# Patient Record
Sex: FEMALE | Race: WHITE
[De-identification: names, ages, dates, MRNs, and addresses within clinical notes are randomized per-mention and may not be internally consistent; named-entity substitution may affect disease eponyms.]

---

## 2018-07-10 ENCOUNTER — HOSPITAL ENCOUNTER (EMERGENCY)
Dept: HOSPITAL 35 - ER | Age: 38
Discharge: HOME | End: 2018-07-10
Payer: COMMERCIAL

## 2018-07-10 VITALS — DIASTOLIC BLOOD PRESSURE: 65 MMHG | SYSTOLIC BLOOD PRESSURE: 110 MMHG

## 2018-07-10 VITALS — HEIGHT: 65 IN | WEIGHT: 180.01 LBS | BODY MASS INDEX: 29.99 KG/M2

## 2018-07-10 DIAGNOSIS — Z88.8: ICD-10-CM

## 2018-07-10 DIAGNOSIS — Z90.49: ICD-10-CM

## 2018-07-10 DIAGNOSIS — R07.89: ICD-10-CM

## 2018-07-10 DIAGNOSIS — Z91.012: ICD-10-CM

## 2018-07-10 DIAGNOSIS — R09.1: Primary | ICD-10-CM

## 2018-07-10 DIAGNOSIS — Z88.1: ICD-10-CM

## 2018-07-10 DIAGNOSIS — R06.02: ICD-10-CM

## 2018-07-10 LAB
ANION GAP SERPL CALC-SCNC: 7 MMOL/L (ref 7–16)
BASOPHILS NFR BLD AUTO: 0.9 % (ref 0–2)
BUN SERPL-MCNC: 18 MG/DL (ref 7–18)
CALCIUM SERPL-MCNC: 9.3 MG/DL (ref 8.5–10.1)
CHLORIDE SERPL-SCNC: 105 MMOL/L (ref 98–107)
CO2 SERPL-SCNC: 27 MMOL/L (ref 21–32)
CREAT SERPL-MCNC: 1.1 MG/DL (ref 0.6–1)
EOSINOPHIL NFR BLD: 7.2 % (ref 0–3)
ERYTHROCYTE [DISTWIDTH] IN BLOOD BY AUTOMATED COUNT: 14.8 % (ref 10.5–14.5)
GLUCOSE SERPL-MCNC: 96 MG/DL (ref 74–106)
GRANULOCYTES NFR BLD MANUAL: 62.2 % (ref 36–66)
HCT VFR BLD CALC: 39.9 % (ref 37–47)
HGB BLD-MCNC: 13.9 GM/DL (ref 12–15)
LYMPHOCYTES NFR BLD AUTO: 23.4 % (ref 24–44)
MCH RBC QN AUTO: 29.8 PG (ref 26–34)
MCHC RBC AUTO-ENTMCNC: 34.7 G/DL (ref 28–37)
MCV RBC: 86 FL (ref 80–100)
MONOCYTES NFR BLD: 6.3 % (ref 1–8)
NEUTROPHILS # BLD: 5.4 THOU/UL (ref 1.4–8.2)
PLATELET # BLD: 377 THOU/UL (ref 150–400)
POTASSIUM SERPL-SCNC: 4.4 MMOL/L (ref 3.5–5.1)
RBC # BLD AUTO: 4.64 MIL/UL (ref 4.2–5)
SODIUM SERPL-SCNC: 139 MMOL/L (ref 136–145)
TROPONIN I SERPL-MCNC: <0.06 NG/ML (ref ?–0.06)
WBC # BLD AUTO: 8.7 THOU/UL (ref 4–11)

## 2018-07-12 ENCOUNTER — HOSPITAL ENCOUNTER (EMERGENCY)
Dept: HOSPITAL 35 - ER | Age: 38
Discharge: HOME | End: 2018-07-12
Payer: COMMERCIAL

## 2018-07-12 VITALS — DIASTOLIC BLOOD PRESSURE: 72 MMHG | SYSTOLIC BLOOD PRESSURE: 116 MMHG

## 2018-07-12 VITALS — BODY MASS INDEX: 29.99 KG/M2 | HEIGHT: 65 IN | WEIGHT: 180.01 LBS

## 2018-07-12 DIAGNOSIS — Z88.8: ICD-10-CM

## 2018-07-12 DIAGNOSIS — Z91.012: ICD-10-CM

## 2018-07-12 DIAGNOSIS — Z86.718: ICD-10-CM

## 2018-07-12 DIAGNOSIS — M27.3: Primary | ICD-10-CM

## 2018-07-12 DIAGNOSIS — Z88.0: ICD-10-CM

## 2018-07-12 DIAGNOSIS — Z88.1: ICD-10-CM

## 2018-07-12 DIAGNOSIS — Z90.49: ICD-10-CM

## 2018-07-23 ENCOUNTER — HOSPITAL ENCOUNTER (EMERGENCY)
Dept: HOSPITAL 35 - ER | Age: 38
Discharge: HOME | End: 2018-07-23
Payer: COMMERCIAL

## 2018-07-23 VITALS — HEIGHT: 65 IN | WEIGHT: 180.01 LBS | BODY MASS INDEX: 29.99 KG/M2

## 2018-07-23 VITALS — SYSTOLIC BLOOD PRESSURE: 118 MMHG | DIASTOLIC BLOOD PRESSURE: 79 MMHG

## 2018-07-23 DIAGNOSIS — Z88.0: ICD-10-CM

## 2018-07-23 DIAGNOSIS — Z88.1: ICD-10-CM

## 2018-07-23 DIAGNOSIS — R07.1: Primary | ICD-10-CM

## 2018-07-23 DIAGNOSIS — Z90.49: ICD-10-CM

## 2018-07-23 DIAGNOSIS — N80.9: ICD-10-CM

## 2018-07-23 DIAGNOSIS — Z86.718: ICD-10-CM

## 2018-07-23 DIAGNOSIS — Z90.89: ICD-10-CM

## 2018-07-23 DIAGNOSIS — Z91.012: ICD-10-CM

## 2018-07-23 DIAGNOSIS — Z88.8: ICD-10-CM

## 2018-07-23 LAB
ALBUMIN SERPL-MCNC: 3.6 G/DL (ref 3.4–5)
ALT SERPL-CCNC: 31 U/L (ref 30–65)
ANION GAP SERPL CALC-SCNC: 8 MMOL/L (ref 7–16)
AST SERPL-CCNC: 15 U/L (ref 15–37)
BASOPHILS NFR BLD AUTO: 0 % (ref 0–2)
BILIRUB SERPL-MCNC: 0.2 MG/DL
BUN SERPL-MCNC: 29 MG/DL (ref 7–18)
CALCIUM SERPL-MCNC: 8.9 MG/DL (ref 8.5–10.1)
CHLORIDE SERPL-SCNC: 106 MMOL/L (ref 98–107)
CO2 SERPL-SCNC: 27 MMOL/L (ref 21–32)
CREAT SERPL-MCNC: 0.9 MG/DL (ref 0.6–1)
D DIMER PPP FEU-MCNC: 1.19 UG/MLFEU (ref 0.19–0.5)
EOSINOPHIL NFR BLD: 7 % (ref 0–3)
ERYTHROCYTE [DISTWIDTH] IN BLOOD BY AUTOMATED COUNT: 15.3 % (ref 10.5–14.5)
GLUCOSE SERPL-MCNC: 99 MG/DL (ref 74–106)
GRANULOCYTES NFR BLD MANUAL: 55 % (ref 36–66)
HCT VFR BLD CALC: 35.9 % (ref 37–47)
HGB BLD-MCNC: 12.7 GM/DL (ref 12–15)
INR PPP: 1
LYMPHOCYTES NFR BLD AUTO: 30 % (ref 24–44)
MCH RBC QN AUTO: 30.4 PG (ref 26–34)
MCHC RBC AUTO-ENTMCNC: 35.3 G/DL (ref 28–37)
MCV RBC: 85.9 FL (ref 80–100)
MONOCYTES NFR BLD: 7 % (ref 1–8)
NEUTROPHILS # BLD: 5.3 THOU/UL (ref 1.4–8.2)
NEUTS BAND NFR BLD: 1 % (ref 0–8)
PLATELET # BLD EST: NORMAL 10*3/UL
PLATELET # BLD: 404 THOU/UL (ref 150–400)
POTASSIUM SERPL-SCNC: 4 MMOL/L (ref 3.5–5.1)
PROT SERPL-MCNC: 6.5 G/DL (ref 6.4–8.2)
PROTHROMBIN TIME: 10.1 SECONDS (ref 9.3–11.4)
RBC # BLD AUTO: 4.18 MIL/UL (ref 4.2–5)
RBC MORPH BLD: NORMAL
SODIUM SERPL-SCNC: 141 MMOL/L (ref 136–145)
TROPONIN I SERPL-MCNC: <0.06 NG/ML (ref ?–0.06)
WBC # BLD AUTO: 9.5 THOU/UL (ref 4–11)

## 2019-01-16 ENCOUNTER — HOSPITAL ENCOUNTER (OUTPATIENT)
Dept: HOSPITAL 35 - PAIN | Age: 39
End: 2019-01-16
Attending: ANESTHESIOLOGY
Payer: COMMERCIAL

## 2019-01-16 VITALS — SYSTOLIC BLOOD PRESSURE: 124 MMHG | DIASTOLIC BLOOD PRESSURE: 83 MMHG

## 2019-01-16 VITALS — BODY MASS INDEX: 28.32 KG/M2 | HEIGHT: 65 IN | WEIGHT: 170 LBS

## 2019-01-16 DIAGNOSIS — S92.214A: ICD-10-CM
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
## 2019-01-16 DIAGNOSIS — G89.4: ICD-10-CM

## 2019-01-16 DIAGNOSIS — M79.671: ICD-10-CM

## 2019-01-16 DIAGNOSIS — M17.11: Primary | ICD-10-CM
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
## 2019-01-16 NOTE — NUR
Pain Clinic Assessment:
 
1. History of Osteoarthritis:
osteopenia
   History of Rheumatoid Arthritis:
Not Applicable
 
2. Height: 5 ft. 5 in. 165.1 cm.
   Weight: 170.0 lb.  oz. 77.112 kg.
   Patient's BMI: 28.3
 
3. Vital Signs:
   BP: 124/83 Pulse: 93 Resp: 16
   Temp:  02 Sat: 98 ECG Mon:
 
4. Pain Intensity: 7
 
5. Fall Risk:
   Dizziness: N  Needs help standing or walking: N
   Fallen in the last 3 months: Y
   Fall risk comments:
 
 
6. Patient on Blood Thinner: Clopidogrel Bisulf(Plavix
 
7. History of Hypertension: N
 
8. Opioid Therapy greater than 6 weeks:
   Opiate Contract Signed:
 
9. Risk Assessment Tool Provided: low
 
10. Functional Assessment Tool: 36/70
 
11. Recreational Drug Use: Never Drug Type:
    Tobacco Use: Never Smoker Tobacco Type:
       Amount or Packs/day:  How Many Years:
    Alcohol Use: Yes  Frequency: Special Occasions Quant:

## 2019-01-16 NOTE — HPC
[facility]
[place]   [address]                     PAIN MANAGEMENT CONSULTATION  
_______________________________________________________________________________
 
Name:       RALPH HONG NICANOR           [facility] #:                     [facility]      [id number]                       Account #:      [id number]  
Admission:  01/16/19    Attend Phys:    Cesar Malhotra DO  
Discharge:              Date of Birth:  [birth year]  
                                                          Report #: [id number]
                                                                    [id number]   
_______________________________________________________________________________
THIS REPORT FOR:   //name//                          
 
CC: Rhoda Augustine
 
DATE OF SERVICE:  01/16/2019
 
 
CHIEF COMPLAINT:  Right knee pain, right foot pain.
 
HISTORY OF PRESENT ILLNESS:  As you know, the patient is a 38-year-old female
who reports increasing right knee pain and right foot pain began in 12/2018. 
The patient apparently has had longstanding right knee pain, which will require
surgical intervention according to the patient in April once she is able to come
off her anticoagulant in the form of Plavix.  She apparently sustained a fall on
12/03/2018 with a potential inversion injury.  It was noted that the patient has
a cuboid nondisplaced fracture on that right foot.  She has been treated through
her orthopedic surgeon in regards to this issue.  The patient continues to
experience pain and will not be able to undergo surgery until April due to use
of Plavix therapy.  She was referred to our clinic to discuss appropriateness of
medication management in her case.  She does have a nondisplaced cuboid fracture
with associated reactive edema and the patient is in a Cam boot currently.  She
has been advised to use limited weightbearing.  She also continues to experience
right knee pain, which is exacerbated by the Cam boot.  She has been referred to
our clinic to discuss appropriateness of medication management.
 
The patient indicates today pain is continuous, describes the pain as aching,
sharp and throbbing, places current pain score 7/10, daily average at 6-7/10,
worst pain has been at 9/10.  The patient states pain is exacerbated with
activity, improves with rest and medications.  The majority of her pain is
located in the right knee and right foot.
 
PAST MEDICAL HISTORY:
1.  Depression.
2.  Chronic anticoagulation.
3.  Gastroesophageal reflux disease.
4.  Osteopenia.
5.  Crohn disease.
 
PAST SURGICAL HISTORY:
1.  Closure of a PFO.
2.  Right meniscal injury repair.
3.  Right hip repair.
 
SOCIAL HISTORY:  The patient denies tobacco, IV or illicit drug use.  Admits to
 
 
 
[address]   [address]                     PAIN MANAGEMENT CONSULTATION  
_______________________________________________________________________________
 
Name:       RALPH HONG NICANOR           Room #:                     REG [facility] 
SUSANNA#:      [id number]                       Account #:      [id number]  
Admission:  01/16/19    Attend Phys:    Cesar Malhotra DO  
Discharge:              Date of Birth:  [birth year]  
                                                          Report #: [id number]
                                                                    [id number]   
_______________________________________________________________________________
3 alcoholic beverages per week.  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  She has been out
of work for 2 months.  She is not receiving workmen's compensation or is she
trying to obtain disability benefits.  She is not in litigation in regards to
pain.
 
REVIEW OF SYSTEMS:  Positive for nausea, painful menses, irregular menses,
depression, bleeding and bruising tendencies, chronic abdominal pain,
osteopenia, right knee pain, right foot pain status post injury.  All other
review of systems negative per 12-point review of systems other than those
listed in the history of present illness.
 
Pain impact score 36/70 indicating moderate interference of daily activities
secondary to pain.
 
ALLERGIES:  COMPAZINE, PENICILLIN, AMITRIPTYLINE, LEVOTHYROXINE.
 
CURRENT MEDICATIONS:  Tylenol Extra Strength 1000 mg every 6 hours, aspirin 81
mg per day, Lexapro 10 mg per day, Imuran 50 mg once a day, venlafaxine 
mg once a day, pantoprazole 40 mg per day, Plavix 75 mg per day, clonazepam 1 mg
p.r.n. anxiety, Abilify 5 mg per day.
 
IMAGING:  There is no imaging of the right knee.
 
MRI of right foot shows nondisplaced cuboid fracture with associated reactive
edema.  Normal edema seen within the metatarsals, minimal soft tissue edema
noted laterally.
 
PHYSICAL EXAMINATION:
VITAL SIGNS:  Blood pressure 124/83, pulse 93, respiratory rate 16 and
unlabored.  The patient is 98% on room air.  Height 5 feet 5 inches tall, weight
170 pounds, BMI calculated 28.3.
GENERAL:  Well-developed, well-nourished, well-hydrated 38-year-old female,
appearing her stated age.  She is in no acute distress, awake, alert and
oriented x 3.  Pain is rated today at 7/10 involving right knee and right foot.
HEENT:  Normocephalic, atraumatic.  Pupils equal, round, reactive to light. 
Extraocular muscles are intact.  Sclerae nonicteric without injection.
NEUROLOGIC:  Cranial nerves 2 through 12 grossly intact.  Speech is fluent.  The
patient deemed a fair historian.
LUNGS:  Clear, no wheeze, rhonchi or rales.
CARDIOVASCULAR:  Regular.  No appreciable gallop, no rub.
ABDOMEN:  Soft, mildly obese, normoactive bowel sounds.
EXTREMITIES:  Show no clubbing, no cyanosis, no edema.
MUSCULOSKELETAL:  The patient has a Cam boot over right foot.  Gait is antalgic
secondary to a Cam boot and right knee pain.  Active and passive range of motion
of right knee intensifies knee pain with no radiation of symptoms.  Muscle bulk
and tone appears grossly symmetrical when comparing left lower extremity to
 
 
 
[address]   [address]                     PAIN MANAGEMENT CONSULTATION  
_______________________________________________________________________________
 
Name:       RALPH HONG           Room #:                     REG [facility]#:      [id number]                       Account #:      [id number]  
Admission:  01/16/19    Attend Phys:    Cesar Malhotra DO  
Discharge:              Date of Birth:  [birth year]  
                                                          Report #: [id number]
                                                                    [id number]   
_______________________________________________________________________________
right.
 
ASSESSMENT:
1.  Right knee pain.
2.  Right knee osteoarthritis.
3.  Right foot pain.
4.  Right cuboid nondisplaced fracture.
5.  Chronic intractable pain.
 
PLAN:  The patient has been referred to our service to discuss the
appropriateness of medication management.  We have taken the liberty prior to
the patient coming to our clinic to perform K-TRACS and MO-TRACS evaluation. 
Our research has shown the patient has received multiple prescriptions of opioid
medications over the past year, seen 29 different prescribers and filling opioid
prescriptions at 20 different pharmacies.  She has received over 55
prescriptions of opioids in the timeframe that we have reviewed.  Due to these
findings, we do not feel that it would be appropriate for us to initiate
medication therapy.  We will not be following the patient long-term and the
findings of this K-TRACS and MO-TRACS evaluation are quite concerning for misuse
of medication management.  I have discussed this with the patient today.  She
indicates the majority of these prescriptions were provided through different
Emergency Departments that she has gone to for various pain issues.  This would
certainly address the multiple physicians she saw, but this does not answer the
question why the patient was filling these prescriptions at 20 different
pharmacies and at many times utilizing cash payment.  I have discussed this with
the patient today and thus I am extremely concerning about opioid management in
this patient's case.  I would indicate, though, that the patient will likely
need some type of pain medications to address her right knee pain and right foot
pain.  Obviously, the foot pain will improve quite quickly 4-6 weeks for a
nondisplaced fracture assuming the patient can remain relatively weight
restricted on that foot.  The right knee pain would likely be improved with the
surgery planned in April.  The following would be suggestions for the referring
physician on treatment for this patient:
1.  Would recommend if you choose to utilize opioid medications such as
hydrocodone that the patient is receiving only a 1-week prescription to maximum
of 2-week prescription, the prescription should be limited to hydrocodone only,
should not be a higher dose than 7.5/325 mg tablet and should not be receiving
more than 3 tablets in any given day.  By having the patient returned on a
weekly basis, you would be able to monitor the patient more closely for any
aberrancies.  I would recommend that the patient sign an opioid contract with
your services, this would does protect you and the patient.  Would also
recommend the patient to be provided a specific pharmacy to fill these
medications at and that the prescription is written so that it is only released
at that specific pharmacy.  I would recommend that the patient take no more than
3 tablets in any given day and that they should be decreased rapidly
approximately 2-3 weeks prior to the planned surgery in April.  This will allow
 
 
 
[address]   [address]                     PAIN MANAGEMENT CONSULTATION  
_______________________________________________________________________________
 
Name:       RALPH HONG           Room #:                     REG [facility]#:      [id number]                       Account #:      [id number]  
Admission:  01/16/19    Attend Phys:    Cesar Malhotra DO  
Discharge:              Date of Birth:  [birth year]  
                                                          Report #: [id number]
                                                                    [id number]   
_______________________________________________________________________________
the patient some analgesic benefit to help with the nondisplaced cuboid
fracture.  It will also help with the right knee pain, but will provide you with
coverage and close contact with the patient for monitoring of opioid use.
2.  Would recommend that if you feel comfortable initiating a nonsteroidal
anti-inflammatory, the nonsteroidal anti-inflammatories tend to help with bony
pain, though I do understand that Orthopedics feel that it has a potential to
reduce bone healing, I will defer to Dr. Lagunas, the referring physician, if
he wishes to start this medication.  If this medication is considered,
nabumetone 500 mg 3 times a day is quite effective taken with meals, also
diclofenac at 50 mg 3 times a day is quite effective again taken with meals.
3.  The patient and I discussed the weightbearing that she is doing currently on
the right foot.  She has been advised by her recollection as well as our
understanding of this fracture to limit weightbearing.  She has been unable to
do so due to her infant child.  Recommend the patient contact the Orthopedic
Surgery team to determine if a roller scooter might be a way for her to be able
to provide less weightbearing, but also be able to handle her young infant.
4.  We wish to thank you for the opportunity to see this patient in
consultation.  This is definitely a complicated case and given her noted
findings on the K-TRACS and MO-TRACS systems, I would recommend that you remain
vigilant on this patient's treatment requiring her to be seen either once a week
or every other week for opioid medications and no more than 3 a day.  I would
not recommend stronger than hydrocodone in this patient's case as post-surgery,
she will probably require something more effective such as Percocet and thus we
do not want to initiate some type of development of tolerance prior to the
surgery.  We are hopeful the information provided in this dictation will help
you direct her care more effectively.  We thank you for the opportunity to see
her in consultation and wish her well with her proposed surgery in April. 
 
Again, we wish to thank you for the opportunity to see this patient in
consultation.
 
 
 
 
 
 
 
 
 
 
 
 
 
 
                         
   By:                               
                   
D: 01/16/19 1224                           _____________________________________
T: 01/16/19 1507                           Cesar Malhotra DO            /nt